# Patient Record
Sex: FEMALE | Race: WHITE | NOT HISPANIC OR LATINO | Employment: FULL TIME | URBAN - METROPOLITAN AREA
[De-identification: names, ages, dates, MRNs, and addresses within clinical notes are randomized per-mention and may not be internally consistent; named-entity substitution may affect disease eponyms.]

---

## 2023-11-01 ENCOUNTER — HOSPITAL ENCOUNTER (OUTPATIENT)
Dept: RADIOLOGY | Facility: HOSPITAL | Age: 43
Discharge: HOME | End: 2023-11-01
Payer: COMMERCIAL

## 2023-11-01 ENCOUNTER — LAB (OUTPATIENT)
Dept: LAB | Facility: LAB | Age: 43
End: 2023-11-01
Payer: COMMERCIAL

## 2023-11-01 DIAGNOSIS — I26.99 OTHER ACUTE PULMONARY EMBOLISM WITHOUT ACUTE COR PULMONALE (MULTI): ICD-10-CM

## 2023-11-01 DIAGNOSIS — I26.99 OTHER PULMONARY EMBOLISM WITHOUT ACUTE COR PULMONALE (MULTI): ICD-10-CM

## 2023-11-01 LAB
ALBUMIN SERPL BCP-MCNC: 4.2 G/DL (ref 3.4–5)
ALP SERPL-CCNC: 67 U/L (ref 33–110)
ALT SERPL W P-5'-P-CCNC: 25 U/L (ref 7–45)
ANION GAP SERPL CALC-SCNC: 14 MMOL/L (ref 10–20)
AST SERPL W P-5'-P-CCNC: 17 U/L (ref 9–39)
BILIRUB SERPL-MCNC: 0.4 MG/DL (ref 0–1.2)
BUN SERPL-MCNC: 16 MG/DL (ref 6–23)
CALCIUM SERPL-MCNC: 8.9 MG/DL (ref 8.6–10.3)
CHLORIDE SERPL-SCNC: 104 MMOL/L (ref 98–107)
CO2 SERPL-SCNC: 26 MMOL/L (ref 21–32)
CREAT SERPL-MCNC: 0.75 MG/DL (ref 0.5–1.05)
GFR SERPL CREATININE-BSD FRML MDRD: >90 ML/MIN/1.73M*2
GLUCOSE SERPL-MCNC: 76 MG/DL (ref 74–99)
POTASSIUM SERPL-SCNC: 4.1 MMOL/L (ref 3.5–5.3)
PROT SERPL-MCNC: 6.9 G/DL (ref 6.4–8.2)
SODIUM SERPL-SCNC: 140 MMOL/L (ref 136–145)

## 2023-11-01 PROCEDURE — 2550000001 HC RX 255 CONTRASTS: Performed by: INTERNAL MEDICINE

## 2023-11-01 PROCEDURE — 80053 COMPREHEN METABOLIC PANEL: CPT

## 2023-11-01 PROCEDURE — 71275 CT ANGIOGRAPHY CHEST: CPT

## 2023-11-01 PROCEDURE — 36415 COLL VENOUS BLD VENIPUNCTURE: CPT

## 2023-11-01 RX ADMIN — IOHEXOL 63 ML: 350 INJECTION, SOLUTION INTRAVENOUS at 16:19

## 2023-11-03 RX ORDER — OMEGA-3-ACID ETHYL ESTERS 1 G/1
CAPSULE, LIQUID FILLED ORAL
COMMUNITY
Start: 2023-10-25

## 2023-11-03 RX ORDER — SULFAMETHOXAZOLE AND TRIMETHOPRIM 800; 160 MG/1; MG/1
TABLET ORAL EVERY 12 HOURS
COMMUNITY

## 2023-11-03 RX ORDER — ATORVASTATIN CALCIUM 20 MG/1
20 TABLET, FILM COATED ORAL DAILY
COMMUNITY

## 2023-11-03 RX ORDER — LEVOTHYROXINE SODIUM 88 UG/1
TABLET ORAL
COMMUNITY
Start: 2023-09-05

## 2023-11-03 RX ORDER — ESCITALOPRAM OXALATE 10 MG/1
10 TABLET ORAL DAILY
COMMUNITY
Start: 2023-09-05

## 2023-11-03 RX ORDER — APIXABAN 5 MG/1
5 TABLET, FILM COATED ORAL 2 TIMES DAILY
COMMUNITY
Start: 2023-09-04

## 2023-11-03 RX ORDER — FENOFIBRATE 54 MG/1
TABLET ORAL EVERY 24 HOURS
COMMUNITY

## 2023-11-08 ENCOUNTER — APPOINTMENT (OUTPATIENT)
Dept: HEMATOLOGY/ONCOLOGY | Facility: CLINIC | Age: 43
End: 2023-11-08

## 2023-11-08 PROBLEM — I26.99 ACUTE PULMONARY EMBOLISM WITHOUT ACUTE COR PULMONALE (MULTI): Status: ACTIVE | Noted: 2023-11-08

## 2023-11-09 ENCOUNTER — OFFICE VISIT (OUTPATIENT)
Dept: HEMATOLOGY/ONCOLOGY | Facility: CLINIC | Age: 43
End: 2023-11-09
Payer: COMMERCIAL

## 2023-11-09 VITALS
DIASTOLIC BLOOD PRESSURE: 71 MMHG | OXYGEN SATURATION: 95 % | TEMPERATURE: 98.1 F | BODY MASS INDEX: 43.16 KG/M2 | HEART RATE: 82 BPM | SYSTOLIC BLOOD PRESSURE: 106 MMHG | RESPIRATION RATE: 20 BRPM | WEIGHT: 228.62 LBS | HEIGHT: 61 IN

## 2023-11-09 DIAGNOSIS — I26.99 OTHER ACUTE PULMONARY EMBOLISM WITHOUT ACUTE COR PULMONALE (MULTI): Primary | ICD-10-CM

## 2023-11-09 PROCEDURE — 99213 OFFICE O/P EST LOW 20 MIN: CPT | Performed by: INTERNAL MEDICINE

## 2023-11-09 PROCEDURE — 1036F TOBACCO NON-USER: CPT | Performed by: INTERNAL MEDICINE

## 2023-11-09 ASSESSMENT — PATIENT HEALTH QUESTIONNAIRE - PHQ9
2. FEELING DOWN, DEPRESSED OR HOPELESS: NOT AT ALL
1. LITTLE INTEREST OR PLEASURE IN DOING THINGS: NOT AT ALL
SUM OF ALL RESPONSES TO PHQ9 QUESTIONS 1 AND 2: 0

## 2023-11-09 ASSESSMENT — COLUMBIA-SUICIDE SEVERITY RATING SCALE - C-SSRS
6. HAVE YOU EVER DONE ANYTHING, STARTED TO DO ANYTHING, OR PREPARED TO DO ANYTHING TO END YOUR LIFE?: NO
2. HAVE YOU ACTUALLY HAD ANY THOUGHTS OF KILLING YOURSELF?: NO

## 2023-11-09 ASSESSMENT — PAIN SCALES - GENERAL: PAINLEVEL: 0-NO PAIN

## 2023-11-09 NOTE — PROGRESS NOTES
LOCATION:  [facility] at [facility].     HEMATOLOGY & ONCOLOGY PROBLEMS:  1.  Right lung unprovoked pulmonary embolism in May 2023       a.  Maintained on Eliquis  2.  History of right leg DVT in June 2017.       a. S/p 8 months of anticoagulation with Xarlto       b. Negative extensive hypercoagulable work-up in November 2017 at [facility].     CHIEF COMPLAINT:    The patient is in the clinic today for hematology evaluation of recurrent thromboembolic complication.                   HISTORY:   Ms Martínez is a 42-year-old pleasant female with history of recurrent thromboembolic episodes.  She was initially diagnosed with right leg DVT in June 2017.  There was no clear-cut precipitating  factor at that time.  She was treated with Xarelto for about 8 months.  She was evaluated by Dr. Sridhar Patton at [facility] and extensive hypercoagulable work-up from November 2023 was unremarkable.  She was evaluated in the ER in May 2023 with left leg  discomfort.  Doppler ultrasound of the left leg was unremarkable.  During evaluation she was also noted to be tachycardic leading to CT angiogram which confirmed right lung pulmonary embolism involving multiple lobar/segmental arteries.  She has been  restarted on anticoagulation with Eliquis tolerating it well.  Just like before, there  were no clear-cut precipitating or provoking factors in May 2023.  Family history is unremarkable.  She is maintained on lifelong anticoagulation.      INTERVAL HISTORY:  Patient denies any new complaints.  Left leg discomfort has resolved.  No history of nausea, vomiting, fever, night sweats, diarrhea, rash, anorexia or weight loss. No recent changes in medications.  Tolerating Eliquis well.     PAST MEDICAL HISTORY:   1.  Recurrent thromboembolic episodes.   2.  Hypothyroidism   3.  Hyperlipidemia     SOCIAL HISTORY:   She is single and lives in [place].  Non-smoker.  Occasional alcohol intake.  She work as a  at  [employer].  Born and raised in [place] [facility] [place].     FAMILY HISTORY:    Mother  at age 62 from breast cancer.  Father age 79 in good health.  3 siblings all alive and well.  She does not have any children.  N o other specific history of bleeding, clotting or malignant disorder in the family.     REVIEW OF SYSTEMS:  Pertinent finding as per the history above.  All other systems have been reviewed and generally negative and noncontributory.     PHYSICAL EXAMINATION:    Detailed examination not done.     Allergies and Intolerances:       Allergies:         No Known Allergies: Active     Outpatient Medication Profile:  * Patient Currently Takes Medications as of 2023 13:49 documented in Structured Notes         Eliquis 5 mg oral tablet : Last Dose Taken:  , Take 2 tabs by mouth twice daily x 7 days         THEN take1 tab by mouth twice a day          , Start Date: 17-May-2023         atorvastatin 20 mg oral tablet: Last Dose Taken:  , 1 tab(s) orally once  a day         escitalopram 10 mg oral tablet: Last Dose Taken:  , 1 tab(s) orally once  a day         levothyroxine 88 mcg (0.088 mg) oral tablet: Last Dose Taken:  , 1 tab(s)  orally once a day         Lovaza 1000 mg oral capsule: Last Dose Taken:  , 2 cap(s) orally 2 times  a day         LORazepam 0.5 mg oral tablet: Last Dose Taken:  , 1 tab(s) orally every  8 hours, As Needed         Vitamin D3 125 mcg (5000 intl units) oral tablet: Last Dose Taken:  ,  1 tab(s) orally 6 times a week except weds         Vitamin D3 125 mcg (5000 intl units) oral tablet: Last Dose Taken:  ,  2 tab(s) orally once a week weds     Lab Results:  CMP was normal on 2023.    Radiology Results:  CT Angio Chest (2023)  IMPRESSION:  1.  A small eccentric filling defect is residual in the right upper lobe branch artery from the previous embolus. No new interval emboli are noted.     Assessment and Plan:   1.  Recurrent thromboembolic complication.  Please refer  to the details as outlined above.  In summary she has an unprovoked right leg DVT in  June 2017 and was treated with about 8 months of anticoagulation with Xarelto.  Extensive hypercoagulable work-up was unremarkable in November 2017 when checked by Dr. Rojas Abdul at [facility].  She did very well for many years but was evaluated in the ER in  May 2023 with complaint of left leg discomfort.  Lower extremity Doppler ultrasound was negative but a CT angiogram done for tachycardia evaluation revealed multiple right lung pulmonary embolism.  She has been restarted on anticoagulation with Eliquis  and so far tolerating it well.      Long discussion with the patient and she is explained that in view of her second episode of unprovoked, unexplained PE, she need to be maintained on anticoagulation for long-term probably lifelong. She already had extensive hypercoagulable work-up done  in 2017 and there is no need to repeat that.  Follow-up CT angiogram from 11/01/2023 showed significant improvement in clot burden although a small residual filling defect is still there.    2. Follow up:  Patient will have follow up with in  12  months. Advised to contact us immediately if there are any new questions or problems.        This note has been transcribed using Dragon voice recognition system and there is a possibility of unintentional typing misprints.

## 2024-11-08 ENCOUNTER — APPOINTMENT (OUTPATIENT)
Dept: HEMATOLOGY/ONCOLOGY | Facility: CLINIC | Age: 44
End: 2024-11-08
Payer: COMMERCIAL